# Patient Record
Sex: FEMALE
[De-identification: names, ages, dates, MRNs, and addresses within clinical notes are randomized per-mention and may not be internally consistent; named-entity substitution may affect disease eponyms.]

---

## 2019-04-09 ENCOUNTER — NEW PATIENT (OUTPATIENT)
Age: 39
End: 2019-04-09

## 2019-04-09 DIAGNOSIS — H00.11: ICD-10-CM

## 2019-04-09 PROCEDURE — 99203 OFFICE O/P NEW LOW 30 MIN: CPT

## 2019-04-09 ASSESSMENT — VISUAL ACUITY
OD_SC: 20/20-1
OS_SC: 20/20-1

## 2019-04-09 ASSESSMENT — TONOMETRY: OD_IOP_MMHG: 18

## 2020-03-11 ENCOUNTER — APPOINTMENT (OUTPATIENT)
Age: 40
Setting detail: DERMATOLOGY
End: 2020-03-11

## 2020-03-11 DIAGNOSIS — L73.2 HIDRADENITIS SUPPURATIVA: ICD-10-CM

## 2020-03-11 DIAGNOSIS — L28.0 LICHEN SIMPLEX CHRONICUS: ICD-10-CM

## 2020-03-11 PROCEDURE — ? TREATMENT REGIMEN

## 2020-03-11 PROCEDURE — ? ORDER TESTS

## 2020-03-11 PROCEDURE — ? PRESCRIPTION

## 2020-03-11 PROCEDURE — ? COUNSELING

## 2020-03-11 PROCEDURE — 99203 OFFICE O/P NEW LOW 30 MIN: CPT

## 2020-03-11 RX ORDER — CLINDAMYCIN PHOSPHATE 10 MG/ML
SOLUTION TOPICAL
Qty: 1 | Refills: 3 | Status: ERX | COMMUNITY
Start: 2020-03-11

## 2020-03-11 RX ORDER — BETAMETHASONE DIPROPIONATE 0.5 MG/G
OINTMENT, AUGMENTED TOPICAL
Qty: 1 | Refills: 0 | Status: ERX | COMMUNITY
Start: 2020-03-11

## 2020-03-11 RX ADMIN — BETAMETHASONE DIPROPIONATE: 0.5 OINTMENT, AUGMENTED TOPICAL at 00:00

## 2020-03-11 RX ADMIN — CLINDAMYCIN PHOSPHATE: 10 SOLUTION TOPICAL at 00:00

## 2020-03-11 ASSESSMENT — LOCATION DETAILED DESCRIPTION DERM
LOCATION DETAILED: RIGHT ANTERIOR LATERAL MALLEOLUS
LOCATION DETAILED: LEFT ANKLE
LOCATION DETAILED: LEFT ELBOW
LOCATION DETAILED: LEFT ANTERIOR MEDIAL PROXIMAL THIGH
LOCATION DETAILED: RIGHT MIDDLE PROXIMAL INTERPHALANGEAL JOINT
LOCATION DETAILED: RIGHT ELBOW
LOCATION DETAILED: LEFT ANTERIOR PROXIMAL THIGH

## 2020-03-11 ASSESSMENT — LOCATION ZONE DERM
LOCATION ZONE: LEG
LOCATION ZONE: FINGER
LOCATION ZONE: ARM

## 2020-03-11 ASSESSMENT — BSA RASH: BSA RASH: 1

## 2020-03-11 ASSESSMENT — LOCATION SIMPLE DESCRIPTION DERM
LOCATION SIMPLE: RIGHT ELBOW
LOCATION SIMPLE: LEFT ELBOW
LOCATION SIMPLE: RIGHT ANKLE
LOCATION SIMPLE: LEFT ANKLE
LOCATION SIMPLE: LEFT THIGH
LOCATION SIMPLE: RIGHT MIDDLE FINGER

## 2020-03-11 ASSESSMENT — HURLEY STAGE
IN YOUR EXPERIENCE, AMONG ALL PATIENTS YOU HAVE SEEN WITH THIS CONDITION, HOW SEVERE IS THIS PATIENT'S CONDITION?: HURLEY STAGE I: ABSCESS FORMATION (SINGLE OR MULTIPLE) WITHOUT SINUS TRACTS OR SCARRING

## 2020-03-11 ASSESSMENT — SEVERITY ASSESSMENT: SEVERITY: MILD TO MODERATE

## 2020-03-11 NOTE — PROCEDURE: TREATMENT REGIMEN
Plan: Will send spironolactone 50 mg tablet post potassium labs.\\nBriefly discussed humira injection.\\n\\nWe discussed intralesional Kenalog injections to help diminish inflammation.  The patient declines treatment today but will consider if the areas remain uncomfortable.\\n\\nPatient was prescribed doxycycline 40 mg 1 tablet daily for the next 3 months.
Detail Level: Zone

## (undated) RX ORDER — NEOMYCIN SULFATE, POLYMYXIN B SULFATE AND DEXAMETHASONE 3.5; 10000; 1 MG/G; [USP'U]/G; MG/G: 1/4 OINTMENT OPHTHALMIC TWICE A DAY